# Patient Record
Sex: MALE | Race: WHITE
[De-identification: names, ages, dates, MRNs, and addresses within clinical notes are randomized per-mention and may not be internally consistent; named-entity substitution may affect disease eponyms.]

---

## 2017-02-15 ENCOUNTER — HOSPITAL ENCOUNTER (EMERGENCY)
Dept: HOSPITAL 47 - EC | Age: 32
Discharge: HOME | End: 2017-02-15
Payer: COMMERCIAL

## 2017-02-15 VITALS — HEART RATE: 88 BPM | DIASTOLIC BLOOD PRESSURE: 65 MMHG | SYSTOLIC BLOOD PRESSURE: 123 MMHG

## 2017-02-15 VITALS — TEMPERATURE: 98 F | RESPIRATION RATE: 18 BRPM

## 2017-02-15 DIAGNOSIS — K06.1: ICD-10-CM

## 2017-02-15 DIAGNOSIS — K04.7: Primary | ICD-10-CM
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
## 2017-02-15 DIAGNOSIS — Z88.6: ICD-10-CM

## 2017-02-15 DIAGNOSIS — S02.5XXA: ICD-10-CM

## 2017-02-15 DIAGNOSIS — F17.200: ICD-10-CM

## 2017-02-15 DIAGNOSIS — Z88.5: ICD-10-CM

## 2017-02-15 PROCEDURE — 41800 DRAINAGE OF GUM LESION: CPT

## 2017-02-15 PROCEDURE — 99282 EMERGENCY DEPT VISIT SF MDM: CPT

## 2017-02-15 NOTE — ED
ENT HPI





- General


Chief complaint: Dental/Oral


Stated complaint: Dental Pain


Time Seen by Provider: 02/15/17 23:30


Source: patient, RN notes reviewed


Mode of arrival: ambulatory


Limitations: no limitations





- History of Present Illness


Initial comments: 





31-year-old male presents emergency Department chief complaint of right-sided 

dental pain.  Patient states that this pain started about 2 weeks ago.  Patient 

states he notices swelling over his tooth at this time so he was concerned.  

Patient denies any radiation of pain to the neck.  Patient denies any 

difficulty opening closing the mouth.  Patient denies any fever or chills.

Patient denies any recent fever, chills, shortness of breath, chest pain, back 

pain, abdominal pain, nausea vomiting, numbness or tingling, dysuria or 

hematuria, constipation or diarrhea, headaches or visual changes, or any other 

current symptoms.





- Related Data


 Previous Rx's











 Medication  Instructions  Recorded


 


Ibuprofen [Motrin] 600 mg PO Q8HR PRN #30 tab 03/31/15


 


Ibuprofen [Motrin] 600 mg PO Q6HR PRN #20 tab 02/15/17


 


Penicillin V Potassium [Pen Vee K] 500 mg PO TID #40 tab 02/15/17


 


traMADol HCl [Ultram] 50 mg PO Q4H PRN #20 tab 02/15/17











 Allergies











Allergy/AdvReac Type Severity Reaction Status Date / Time


 


acetaminophen [From Lortab] AdvReac  Nausea & Verified 02/15/17 23:17





   Vomiting  


 


hydrocodone bitartrate AdvReac  Nausea & Verified 02/15/17 23:17





[From Lortab]   Vomiting  














Review of Systems


ROS Statement: 


Those systems with pertinent positive or pertinent negative responses have been 

documented in the HPI.





ROS Other: All systems not noted in ROS Statement are negative.





Past Medical History


Past Medical History: GERD/Reflux, Hypertension


History of Any Multi-Drug Resistant Organisms: None Reported


Additional Past Surgical History / Comment(s): vasectomy


Past Psychological History: Depression, PTSD


Smoking Status: Current every day smoker


Past Alcohol Use History: Rare


Past Drug Use History: Marijuana





General Exam


Limitations: no limitations


General appearance: alert, in no apparent distress


Head exam: Present: atraumatic, normocephalic, normal inspection


  ** Expanded


Mouth exam: Present: normal external inspection


Teeth exam: Present: dental caries (Throughout), fractured tooth # (Multiple), 

dental tenderness # (76 and 5), gingival enlargement (Above tooth #5), other (

Dental abscess)


Throat exam: normal inspection, tonsillar erythema


Neck exam: Present: normal inspection.  Absent: tenderness, meningismus, 

lymphadenopathy


Respiratory exam: Present: normal lung sounds bilaterally.  Absent: respiratory 

distress, wheezes, rales, rhonchi, stridor


Cardiovascular Exam: Present: regular rate, normal rhythm, normal heart sounds.

  Absent: systolic murmur, diastolic murmur, rubs, gallop, clicks


Neurological exam: Present: alert, oriented X3, CN II-XII intact.  Absent: 

motor sensory deficit


Psychiatric exam: Present: normal affect, normal mood


Skin exam: Present: warm, dry, intact, normal color.  Absent: rash





Course





 Vital Signs











  02/15/17





  23:15


 


Temperature 98 F


 


Pulse Rate 69


 


Respiratory 18





Rate 


 


Blood Pressure 129/74


 


O2 Sat by Pulse 100





Oximetry 














Procedures





- Procedures


Initial comment: 





18-gauge needle was used to excise a dental abscess.  Patient tolerated well.





Medical Decision Making





- Medical Decision Making





31-year-old male presents emergency Department what appears to be dental 

abscess.  Patient underwent I&D.  We discussed follow-up with the dentist.  

Discussed return parameters.  We discussed his medications as prescribed.  

Patient stated that he understood and all his questions have been answered.  He'

ll be discharged home.





Disposition


Clinical Impression: 


 Dental abscess





Disposition: HOME SELF-CARE


Condition: Stable


Instructions:  Dental Abscess (ED)


Additional Instructions: 


Please use medication as discussed. Please follow up with family doctor if 

symptoms have not improved over the next two days. Please return to the 

emergency room if your symptoms increase or worsen or for any other concerns. 





[facility] [address] [place] [address]





[phone] (existing clients only)





For new clients: [phone]





1st consult: $50 (includes Xrays)





Usually 30% less then private dentist for visits after. 





[employer]





Have to pay $50 for Xrays anmd rest is covered.





[phone]


Prescriptions: 


Ibuprofen [Motrin] 600 mg PO Q6HR PRN #20 tab


 PRN Reason: Pain


Penicillin V Potassium [Pen Vee K] 500 mg PO TID #40 tab


traMADol HCl [Ultram] 50 mg PO Q4H PRN #20 tab


 PRN Reason: Pain


Referrals: 


None,Stated [Primary Care Provider] - 1-2 days


Yady Phillips MD [STAFF PHYSICIAN] - 1-2 days


Time of Disposition: 23:38

## 2017-09-24 ENCOUNTER — HOSPITAL ENCOUNTER (EMERGENCY)
Dept: HOSPITAL 47 - EC | Age: 32
Discharge: HOME | End: 2017-09-24
Payer: COMMERCIAL

## 2017-09-24 VITALS
RESPIRATION RATE: 18 BRPM | TEMPERATURE: 98.8 F | DIASTOLIC BLOOD PRESSURE: 75 MMHG | SYSTOLIC BLOOD PRESSURE: 139 MMHG | HEART RATE: 94 BPM

## 2017-09-24 DIAGNOSIS — B35.1: Primary | ICD-10-CM

## 2017-09-24 DIAGNOSIS — F17.200: ICD-10-CM

## 2017-09-24 DIAGNOSIS — Z88.8: ICD-10-CM

## 2017-09-24 DIAGNOSIS — Z88.0: ICD-10-CM

## 2017-09-24 DIAGNOSIS — Z88.5: ICD-10-CM

## 2017-09-24 LAB — GLUCOSE BLD-MCNC: 92 MG/DL (ref 75–99)

## 2017-09-24 PROCEDURE — 36415 COLL VENOUS BLD VENIPUNCTURE: CPT

## 2017-09-24 PROCEDURE — 99283 EMERGENCY DEPT VISIT LOW MDM: CPT

## 2017-09-24 NOTE — ED
General Adult HPI





- General


Chief complaint: Extremity Injury, Lower


Stated complaint: raising toenails


Time Seen by Provider: 09/24/17 18:45


Source: patient, RN notes reviewed


Mode of arrival: ambulatory


Limitations: no limitations





- History of Present Illness


Initial comments: 





Patient 33-year-old male who presents emergency room today with chief complaint 

of bilateral toe pain.  He does admit that his toenails are currently.  States 

she's been trying to keep him clean.  He states that his symptoms should of the 

toenails.  He's had increased pain to the great toes bilaterally.  He states 

that the tip of the toes and seems to be more tender.  He denies any other 

complaints or symptoms.  States does not have a family doctor but seasonal 

basis.  He denies any other complaints or symptoms currently. Patient denies 

any recent fever, chills, shortness of breath, chest pain, back pain, abdominal 

pain, nausea or vomiting, numbness or tingling, dysuria or hematuria, 

constipation or diarrhea, headaches or visual changes, or any other complaints.





- Related Data


 Home Medications











 Medication  Instructions  Recorded  Confirmed


 


Ibuprofen [Motrin] 200 - 400 mg PO Q6HR PRN 02/15/17 02/15/17








 Previous Rx's











 Medication  Instructions  Recorded


 


Ibuprofen [Motrin] 600 mg PO Q6HR PRN #20 tab 02/15/17


 


Penicillin V Potassium [Pen Vee K] 500 mg PO TID #40 tab 02/15/17


 


traMADol HCl [Ultram] 50 mg PO Q4H PRN #20 tab 02/15/17


 


Cephalexin [Keflex] 500 mg PO Q12HR 10 Days 09/24/17


 


Ciclopirox [Penlac] 1 applic TOPICAL HS 30 Days 09/24/17











 Allergies











Allergy/AdvReac Type Severity Reaction Status Date / Time


 


Penicillins Allergy  Unknown Verified 09/24/17 18:42


 


acetaminophen [From Lortab] AdvReac  Nausea & Verified 02/15/17 23:40





   Vomiting  


 


hydrocodone bitartrate AdvReac  Nausea & Verified 02/15/17 23:40





[From Lortab]   Vomiting  














Review of Systems


ROS Statement: 


Those systems with pertinent positive or pertinent negative responses have been 

documented in the HPI.





ROS Other: All systems not noted in ROS Statement are negative.





Past Medical History


Past Medical History: GERD/Reflux, Hypertension


History of Any Multi-Drug Resistant Organisms: None Reported


Additional Past Surgical History / Comment(s): vasectomy


Past Psychological History: Depression, PTSD


Smoking Status: Current every day smoker


Past Alcohol Use History: Rare


Past Drug Use History: Marijuana





General Exam





- General Exam Comments


Initial Comments: 





General:  The patient is awake and alert, in no distress, and does not appear 

acutely ill. 


Eye:  Pupils are equal, round and reactive to light, extra-ocular movements are 

intact.  No nystagmus.  There is normal conjunctiva bilaterally.  No signs of 

icterus.  


Ears, nose, mouth and throat:  There are moist mucous membranes and no oral 

lesions. 


Neck:  The neck is supple, there is no tenderness or JVD.  


Cardiovascular:  There is a regular rate and rhythm. No murmur, rub or gallop 

is appreciated.


Respiratory:  Lungs are clear to auscultation, respirations are non-labored, 

breath sounds are equal.  No wheezes, stridor, rales, or rhonchi.


Musculoskeletal:  Normal ROM, no tenderness.  Strength 5/5. Sensation intact. 

Pulses equal bilaterally 2+.  


Neurological:  A&O x 3. CN II-XII intact, There are no obvious motor or sensory 

deficits. Coordination appears grossly intact. Speech is normal.


Skin: Patient does have focal infection to the great toes bilaterally.  Local 

tenderness to the distal aspect of the toes bilaterally.  No sign of infection.


Psychiatric:  Cooperative, appropriate mood & affect, normal judgment.  


Limitations: no limitations





Course





 Vital Signs











  09/24/17





  18:40


 


Temperature 98.8 F


 


Pulse Rate 94


 


Respiratory 18





Rate 


 


Blood Pressure 139/75


 


O2 Sat by Pulse 99





Oximetry 














Medical Decision Making





- Medical Decision Making





Patient's blood sugar normal urine emergency room.  Was started on topical 

antifungal agent along with antibiotics cover for possible secondary infection.

  Is advised close follow-up the family doctor.





- Lab Data





 Lab Results











  09/24/17 Range/Units





  18:44 


 


POC Glucose (mg/dL)  92  (75-99)  mg/dL


 


POC Glu Operater ID  Joseluis Cunningham  














Disposition


Clinical Impression: 


 Onychomycosis





Disposition: HOME SELF-CARE


Condition: Good


Instructions:  Antifungals (On the skin)


Additional Instructions: 


Please use medication as discussed.  Please follow-up with family doctor for 

further medications.  Please return to emergency room if the symptoms increase 

or worsen or for any other concerns.


Prescriptions: 


Cephalexin [Keflex] 500 mg PO Q12HR 10 Days


Ciclopirox [Penlac] 1 applic TOPICAL HS 30 Days


Referrals: 


None,Stated [Primary Care Provider] - 1-2 days


Luisaan Mcbride MD [REFERRING] - 1-2 days


Flavio Leavitt DO [STAFF PHYSICIAN] - 1-2 days


Time of Disposition: 18:55

## 2018-01-15 ENCOUNTER — HOSPITAL ENCOUNTER (EMERGENCY)
Dept: HOSPITAL 47 - EC | Age: 33
Discharge: HOME | End: 2018-01-15
Payer: COMMERCIAL

## 2018-01-15 VITALS
SYSTOLIC BLOOD PRESSURE: 124 MMHG | RESPIRATION RATE: 18 BRPM | TEMPERATURE: 98 F | HEART RATE: 74 BPM | DIASTOLIC BLOOD PRESSURE: 66 MMHG

## 2018-01-15 DIAGNOSIS — F17.200: ICD-10-CM

## 2018-01-15 DIAGNOSIS — Z88.8: ICD-10-CM

## 2018-01-15 DIAGNOSIS — Z88.5: ICD-10-CM

## 2018-01-15 DIAGNOSIS — K02.9: Primary | ICD-10-CM

## 2018-01-15 DIAGNOSIS — Z88.0: ICD-10-CM

## 2018-01-15 PROCEDURE — 99282 EMERGENCY DEPT VISIT SF MDM: CPT

## 2018-01-15 NOTE — ED
General Adult HPI





- General


Chief complaint: Dental/Oral


Stated complaint: dental pain


Time Seen by Provider: 01/15/18 15:23


Source: patient, RN notes reviewed


Mode of arrival: ambulatory


Limitations: no limitations





- History of Present Illness


Initial comments: 





31 yo male presents to the ER with cc of dental pain.  Patient complains of 

right upper jaw dental pain.  He had this about a year ago he took antibiotics 

and got better and did not follow up with dentist.  Patient states the pain has 

reoccurred.  Patient states is been no nausea vomiting.  No difficulty opening 

closing mouth.  No radiation into the neck.  Patient was concerned due to his 

continued pain so he thought that he should be seen.  He does have an ALLERGY 

to penicillins.Patient denies any recent fever, chills, shortness of breath, 

chest pain, back pain, abdominal pain, nausea vomiting, numbness or tingling, 

dysuria or hematuria, constipation or diarrhea, headaches or visual changes, or 

any other current symptoms.





- Related Data


 Previous Rx's











 Medication  Instructions  Recorded


 


Clindamycin [Cleocin] 450 mg PO Q8HR #90 capsule 01/15/18


 


Ibuprofen [Motrin] 600 mg PO Q6HR PRN #20 tab 01/15/18











 Allergies











Allergy/AdvReac Type Severity Reaction Status Date / Time


 


Penicillins Allergy  Unknown Verified 01/15/18 15:36


 


hydrocodone bitartrate AdvReac  Nausea & Verified 01/15/18 15:36





[From Lortab]   Vomiting  














Review of Systems


ROS Statement: 


Those systems with pertinent positive or pertinent negative responses have been 

documented in the HPI.





ROS Other: All systems not noted in ROS Statement are negative.





Past Medical History


Past Medical History: GERD/Reflux, Hypertension


History of Any Multi-Drug Resistant Organisms: None Reported


Past Surgical History: No Surgical Hx Reported


Additional Past Surgical History / Comment(s): vasectomy


Past Psychological History: Depression, PTSD


Smoking Status: Current every day smoker


Past Alcohol Use History: None Reported


Past Drug Use History: Marijuana





General Exam


Limitations: no limitations


General appearance: alert, in no apparent distress


Eye exam: Present: normal appearance, PERRL, EOMI.  Absent: scleral icterus, 

conjunctival injection, periorbital swelling


ENT exam: Present: normal exam, mucous membranes moist, other (No abscess 

noted.  Patient does have diffuse dental caries)


Neck exam: Present: normal inspection.  Absent: tenderness, meningismus, 

lymphadenopathy


Respiratory exam: Present: normal lung sounds bilaterally.  Absent: respiratory 

distress, wheezes, rales, rhonchi, stridor


Cardiovascular Exam: Present: regular rate, normal rhythm, normal heart sounds.

  Absent: systolic murmur, diastolic murmur, rubs, gallop, clicks


Back exam: Present: normal inspection


Neurological exam: Present: alert, oriented X3


Psychiatric exam: Present: normal affect, normal mood


Skin exam: Present: warm, dry, intact, normal color.  Absent: rash





Course





 Vital Signs











  01/15/18





  15:22


 


Temperature 98 F


 


Pulse Rate 74


 


Respiratory 18





Rate 


 


Blood Pressure 124/66


 


O2 Sat by Pulse 100





Oximetry 














Medical Decision Making





- Medical Decision Making





32-year-old male presents emergency department with a chief complaint of dental 

pain.  At this time we will start patient antibiotics.  He does have diffuse 

caries.  We discussed important follow-up with the dentist we discussed return 

parameters all questions.  Patient stated he understood he is given plan.  He 

will be discharged.





Disposition


Clinical Impression: 


 Dental caries





Disposition: HOME SELF-CARE


Condition: Stable


Instructions:  Dental Caries (ED)


Additional Instructions: 


Please use medication as discussed. Please follow up with family doctor if 

symptoms have not improved over the next two days. Please return to the 

emergency room if your symptoms increase or worsen or for any other concerns. 





[facility]





[facility] [place] [address]





[phone] (existing clients only)





For new clients: [phone]





1st consult: $50 (includes Xrays)





Usually 30% less then private dentist for visits after. 





[employer]





Have to pay $50 for Xrays anmd rest is covered.





[phone]


Prescriptions: 


Clindamycin [Cleocin] 450 mg PO Q8HR #90 capsule


Ibuprofen [Motrin] 600 mg PO Q6HR PRN #20 tab


 PRN Reason: Pain


Referrals: 


Luisana Mcbride MD [REFERRING] - 1-2 days


Time of Disposition: 15:47

## 2018-10-13 ENCOUNTER — HOSPITAL ENCOUNTER (EMERGENCY)
Dept: HOSPITAL 47 - EC | Age: 33
Discharge: HOME | End: 2018-10-13
Payer: COMMERCIAL

## 2018-10-13 VITALS
TEMPERATURE: 98.2 F | RESPIRATION RATE: 18 BRPM | SYSTOLIC BLOOD PRESSURE: 131 MMHG | DIASTOLIC BLOOD PRESSURE: 77 MMHG | HEART RATE: 70 BPM

## 2018-10-13 DIAGNOSIS — F32.9: ICD-10-CM

## 2018-10-13 DIAGNOSIS — M79.671: ICD-10-CM

## 2018-10-13 DIAGNOSIS — M79.674: Primary | ICD-10-CM

## 2018-10-13 DIAGNOSIS — Z88.0: ICD-10-CM

## 2018-10-13 DIAGNOSIS — Z79.899: ICD-10-CM

## 2018-10-13 DIAGNOSIS — F17.200: ICD-10-CM

## 2018-10-13 DIAGNOSIS — Z88.8: ICD-10-CM

## 2018-10-13 LAB — GLUCOSE BLD-MCNC: 107 MG/DL (ref 75–99)

## 2018-10-13 PROCEDURE — 36415 COLL VENOUS BLD VENIPUNCTURE: CPT

## 2018-10-13 PROCEDURE — 99284 EMERGENCY DEPT VISIT MOD MDM: CPT

## 2018-10-13 NOTE — XR
EXAMINATION TYPE: XR foot complete RT

 

DATE OF EXAM: 10/13/2018

 

COMPARISON: NONE

 

HISTORY: Gout. Pain.

 

TECHNIQUE: 3 views

 

FINDINGS: I see no fracture nor dislocation. Joint spaces are normal. There are no erosions. Soft tis
sues appear normal.

 

IMPRESSION: Negative exam. No evidence of gout.

## 2018-10-13 NOTE — ED
Lower Extremity Injury HPI





- General


Chief Complaint: Extremity Injury, Lower


Stated Complaint: Toe pain


Time Seen by Provider: 10/13/18 21:48


Source: patient, family, RN notes reviewed, old records reviewed


Mode of arrival: ambulatory


Limitations: no limitations





- History of Present Illness


Initial Comments: 





This is a 32 yo male with R great toe pain intermiettently for 2 days. Patient 

states he is concerned he is diabetic due to foot pain. Patient reports no 

symptoms of DM including thirst, polyuria. Patient denies any other symptoms. 

Patient denies traumato foot. Denies skin changes. 





- Related Data


 Home Medications











 Medication  Instructions  Recorded  Confirmed


 


QUEtiapine [SEROquel] 200 mg PO HS 10/13/18 10/13/18


 


traZODone HCL [Desyrel] 200 mg PO HS 10/13/18 10/13/18








 Previous Rx's











 Medication  Instructions  Recorded


 


Ibuprofen 600 mg PO TID #20 tablet 10/13/18











 Allergies











Allergy/AdvReac Type Severity Reaction Status Date / Time


 


Penicillins Allergy  Unknown Verified 10/13/18 21:40


 


hydrocodone bitartrate AdvReac  Nausea & Verified 10/13/18 21:40





[From Lortab]   Vomiting  














Review of Systems


ROS Statement: 


Those systems with pertinent positive or pertinent negative responses have been 

documented in the HPI.





ROS Other: All systems not noted in ROS Statement are negative.





Past Medical History


Past Medical History: GERD/Reflux, Hypertension


History of Any Multi-Drug Resistant Organisms: None Reported


Past Surgical History: No Surgical Hx Reported


Additional Past Surgical History / Comment(s): vasectomy


Past Psychological History: Depression, PTSD


Smoking Status: Current every day smoker


Past Alcohol Use History: None Reported


Past Drug Use History: Marijuana





General Exam





- General Exam Comments


Initial Comments: 





Well appearing 33 year old male, no distress. 


Limitations: no limitations


General appearance: alert, in no apparent distress


Head exam: Present: atraumatic, normocephalic, normal inspection


Eye exam: Present: normal appearance, PERRL, EOMI.  Absent: scleral icterus, 

conjunctival injection, periorbital swelling


ENT exam: Present: normal exam, mucous membranes moist


Neck exam: Present: normal inspection.  Absent: tenderness, meningismus, 

lymphadenopathy


Respiratory exam: Present: normal lung sounds bilaterally.  Absent: respiratory 

distress, wheezes, rales, rhonchi, stridor


Cardiovascular Exam: Present: regular rate, normal rhythm, normal heart sounds.

  Absent: systolic murmur, diastolic murmur, rubs, gallop, clicks


Extremities exam: Present: normal inspection, full ROM, normal capillary 

refill.  Absent: tenderness, pedal edema, joint swelling, calf tenderness


  ** Right


Knee exam: Present: normal inspection, full ROM


Lower Leg exam: Present: normal inspection, full ROM


Ankle exam: Present: normal inspection, full ROM


Foot/Toe exam: Present: normal inspection, full ROM


Neurovascular tendon exam: Present: no vascular compromise


Gait: observed and normal


Back exam: Present: normal inspection


Neurological exam: Present: alert, oriented X3, CN II-XII intact


Psychiatric exam: Present: normal affect, normal mood


Skin exam: Present: warm, dry, intact, normal color.  Absent: rash





Course


 Vital Signs











  10/13/18





  21:35


 


Temperature 98.2 F


 


Pulse Rate 70


 


Respiratory 18





Rate 


 


Blood Pressure 131/77


 


O2 Sat by Pulse 100





Oximetry 














Medical Decision Making





- Medical Decision Making





33 year old male with R foot pain, convinced that he may have diabetes due to 

foot pain. Patient BG is 109, no symptoms of diabetes. Discussed patient is not 

diabetic. Patient has normal pulses. Patient has no signs of gout at this time, 

no skin changes. CXR of foot was reviewed and normal. Discussed that patient 

can follow up with PCP and RICE the foot and toe. DC with ibuprofen. 





- Lab Data


 Lab Results











  10/13/18 Range/Units





  22:23 


 


POC Glucose (mg/dL)  107 H  (75-99)  mg/dL


 


POC Glu Operater ID  Nadine Loya  














- Radiology Data


Radiology results: report reviewed


Normal foot xray, no sign of gout arthropathy. 





Disposition


Clinical Impression: 


 Toe pain, left





Disposition: HOME SELF-CARE


Condition: Good


Additional Instructions: 


Patient has rest, ice, and elevate the foot.  Return if there is any signs of 

skin changes including redness.  Take medication as prescribed.  Return to 

emergency department if any alarming signs or symptoms occur.


Prescriptions: 


Ibuprofen 600 mg PO TID #20 tablet


Is patient prescribed a controlled substance at d/c from ED?: No


Referrals: 


None,Stated [Primary Care Provider] - 1-2 days


Time of Disposition: 22:59

## 2025-05-13 ENCOUNTER — HOSPITAL ENCOUNTER (EMERGENCY)
Dept: HOSPITAL 47 - EC | Age: 40
Discharge: HOME | End: 2025-05-13
Payer: COMMERCIAL

## 2025-05-13 VITALS
SYSTOLIC BLOOD PRESSURE: 123 MMHG | RESPIRATION RATE: 17 BRPM | TEMPERATURE: 98.2 F | DIASTOLIC BLOOD PRESSURE: 85 MMHG | HEART RATE: 64 BPM

## 2025-05-13 DIAGNOSIS — Z88.5: ICD-10-CM

## 2025-05-13 DIAGNOSIS — Z88.0: ICD-10-CM

## 2025-05-13 DIAGNOSIS — J06.9: Primary | ICD-10-CM

## 2025-05-13 PROCEDURE — 94640 AIRWAY INHALATION TREATMENT: CPT

## 2025-05-13 PROCEDURE — 99285 EMERGENCY DEPT VISIT HI MDM: CPT

## 2025-05-13 PROCEDURE — 71046 X-RAY EXAM CHEST 2 VIEWS: CPT

## 2025-05-13 PROCEDURE — 87636 SARSCOV2 & INF A&B AMP PRB: CPT

## 2025-05-13 PROCEDURE — 96372 THER/PROPH/DIAG INJ SC/IM: CPT

## 2025-05-13 RX ADMIN — DEXTROSE STA MG: 50 INJECTION, SOLUTION INTRAVENOUS at 16:07

## 2025-05-13 RX ADMIN — BENZONATATE STA MG: 100 CAPSULE ORAL at 16:07

## 2025-05-13 RX ADMIN — IPRATROPIUM BROMIDE AND ALBUTEROL SULFATE STA ML: .5; 3 SOLUTION RESPIRATORY (INHALATION) at 16:49

## 2025-05-13 RX ADMIN — IBUPROFEN STA MG: 800 TABLET, FILM COATED ORAL at 16:07

## 2025-05-13 RX ADMIN — ACETAMINOPHEN STA MG: 500 TABLET ORAL at 16:07
